# Patient Record
Sex: FEMALE | Employment: OTHER | URBAN - METROPOLITAN AREA
[De-identification: names, ages, dates, MRNs, and addresses within clinical notes are randomized per-mention and may not be internally consistent; named-entity substitution may affect disease eponyms.]

---

## 2017-10-12 ENCOUNTER — HOSPITAL ENCOUNTER (OUTPATIENT)
Dept: MAMMOGRAPHY | Age: 52
Discharge: HOME | End: 2017-10-12
Attending: OBSTETRICS & GYNECOLOGY
Payer: COMMERCIAL

## 2017-10-12 DIAGNOSIS — Z12.31 VISIT FOR SCREENING MAMMOGRAM: ICD-10-CM

## 2017-10-12 PROCEDURE — 77063 BREAST TOMOSYNTHESIS BI: CPT

## 2017-10-17 ENCOUNTER — DOCUMENTATION ONLY (OUTPATIENT)
Dept: SURGERY | Age: 52
End: 2017-10-17

## 2018-10-15 ENCOUNTER — HOSPITAL ENCOUNTER (OUTPATIENT)
Dept: MAMMOGRAPHY | Age: 53
Discharge: HOME | End: 2018-10-15
Attending: FAMILY MEDICINE
Payer: COMMERCIAL

## 2018-10-15 DIAGNOSIS — Z12.31 VISIT FOR SCREENING MAMMOGRAM: ICD-10-CM

## 2018-10-15 PROCEDURE — 77063 BREAST TOMOSYNTHESIS BI: CPT

## 2018-12-03 ENCOUNTER — OFFICE VISIT (OUTPATIENT)
Dept: ORTHOPEDIC SURGERY | Age: 53
End: 2018-12-03

## 2018-12-03 VITALS
BODY MASS INDEX: 35.79 KG/M2 | WEIGHT: 228 LBS | RESPIRATION RATE: 16 BRPM | OXYGEN SATURATION: 99 % | DIASTOLIC BLOOD PRESSURE: 76 MMHG | HEIGHT: 67 IN | TEMPERATURE: 97.4 F | HEART RATE: 62 BPM | SYSTOLIC BLOOD PRESSURE: 119 MMHG

## 2018-12-03 DIAGNOSIS — M75.01 ADHESIVE CAPSULITIS OF RIGHT SHOULDER: Primary | ICD-10-CM

## 2018-12-03 DIAGNOSIS — M25.511 RIGHT SHOULDER PAIN, UNSPECIFIED CHRONICITY: ICD-10-CM

## 2018-12-03 PROBLEM — E66.01 SEVERE OBESITY (HCC): Status: ACTIVE | Noted: 2018-12-03

## 2018-12-03 RX ORDER — LEVOTHYROXINE SODIUM 75 UG/1
TABLET ORAL
COMMUNITY

## 2018-12-03 RX ORDER — BISMUTH SUBSALICYLATE 262 MG
1 TABLET,CHEWABLE ORAL DAILY
COMMUNITY

## 2018-12-03 RX ORDER — IBUPROFEN 400 MG/1
TABLET ORAL
COMMUNITY

## 2018-12-03 RX ORDER — MELOXICAM 15 MG/1
15 TABLET ORAL
Qty: 30 TAB | Refills: 1 | Status: SHIPPED | OUTPATIENT
Start: 2018-12-03 | End: 2019-01-10 | Stop reason: SDUPTHER

## 2018-12-03 NOTE — PROGRESS NOTES
[facility] Allison 1965 Chief Complaint Patient presents with  Shoulder Pain RIGHT SHOULDER PAIN  
  
 
HISTORY OF PRESENT ILLNESS [id number] [facility] Allison is a 47 y.o. female who presents today for evaluation of right shoulder pain. She rates her pain 8/10 today. Pain has been present for 2 weeks after reaching behind her car seat. She notes he has started a new weight lifting routine recently. Patient describes the pain as aching, sharp and throbbing that is Constant in nature. Symptoms are worse with reaching behind, certain movements of the arm, Activity and is better with  Rest. Associated symptoms include stiffness. Since problem started, it: has worsened. Pain does not wake patient up at night. Has taken NSAIDS for the problem. Has tried following treatments: Injections:NO; Brace:NO; Therapy:NO; Cane/Crutch:NO Allergies Allergen Reactions  Sulfa (Sulfonamide Antibiotics) Rash and Not Reported This Time Past Medical History:  
Diagnosis Date  Amenorrhea 1/22/2010  Anemia 1/22/2010  Fibroids 1/22/2010  LBP (low back pain) 9/25/2009  Migraine headache 1/22/2010 Social History Socioeconomic History  Marital status:  Spouse name: Not on file  Number of children: Not on file  Years of education: Not on file  Highest education level: Not on file Social Needs  Financial resource strain: Not on file  Food insecurity - worry: Not on file  Food insecurity - inability: Not on file  Transportation needs - medical: Not on file  Transportation needs - non-medical: Not on file Occupational History  Occupation:  Tobacco Use  Smoking status: Never Smoker  Smokeless tobacco: Never Used Substance and Sexual Activity  Alcohol use: Yes Comment: every 1-2 months  Drug use: No  
 Sexual activity: Not on file Other Topics Concern  Not on file Social History Narrative  Not on file History reviewed. No pertinent surgical history. Family History Problem Relation Age of Onset  Cancer Mother   
     breast CA about 70yo  Hypertension Mother  Thyroid Disease Mother  Osteoporosis Mother  Breast Cancer Mother  Diabetes Father  Hypertension Father  High Cholesterol Father  Heart Disease Father 76s Current Outpatient Medications Medication Sig  levothyroxine (SYNTHROID) 75 mcg tablet Take  by mouth Daily (before breakfast).  multivitamin (ONE A DAY) tablet Take 1 Tab by mouth daily.  calcium-vits X9-D-M1-minerals 166.75 mg- 166.75 unit cap Take  by mouth.  ibuprofen (IBU) 400 mg tablet Take  by mouth every six (6) hours as needed for Pain.  multivitamin, stress formula (STRESS TAB) tablet Take 1 Tab by mouth daily. No current facility-administered medications for this visit. REVIEW OF SYSTEM Patient denies: Weight loss, Fever/Chills, HA, Visual changes, Fatigue, Chest pain, SOB, Abdominal pain, N/V/D/C, Blood in stool or urine, Edema. Pertinent positive as above in HPI. All others were negative PHYSICAL EXAM:  
Visit Vitals /76 Pulse 62 Temp 97.4 °F (36.3 °C) (Oral) Resp 16 Ht 5' 7\" (1.702 m) Wt 228 lb (103.4 kg) SpO2 99% BMI 35.71 kg/m² The patient is a well-developed, well-nourished female   in no acute distress. The patient is alert and oriented times three. The patient is alert and oriented times three. Mood and affect are normal. 
LYMPHATIC: lymph nodes are not enlarged and are within normal limits SKIN: normal in color and non tender to palpation. There are no bruises or abrasions noted. NEUROLOGICAL: Motor sensory exam is within normal limits. Reflexes are equal bilaterally. There is normal sensation to pinprick and light touch MUSCULOSKELETAL: 
Examination Right shoulder Skin Intact AC joint tenderness - Biceps tenderness - Forward flexion/Elevation  Active abduction  Glenohumeral abduction 80 External rotation ROM 45 Internal rotation ROM 30 Apprehension -  
Gregs Relocation -  
Jerk - Load and Shift -  
Jerl Clarity - Speeds - Impingement sign - Supraspinatus/Empty Can -, 5/5 External Rotation Strength -, 5/5 Lift Off/Belly Press -, 5/5 Neurovascular Intact IMAGING: XR of right shoulder dated 12/3/18 was reviewed and read: No acute abnormalities IMPRESSION:   
  ICD-10-CM ICD-9-CM 1. Adhesive capsulitis of right shoulder M75.01 726.0 REFERRAL TO PHYSICAL THERAPY 2. Right shoulder pain, unspecified chronicity M25.511 719.41 AMB POC XRAY, SHOULDER; COMPLETE, 2+ REFERRAL TO PHYSICAL THERAPY PLAN: 
1. The patient presents today with right shoulder pain due to adhesive capsulitis. I would like her to begin PT. Risk factors include: n/a 2. No ultrasound exam indicated today 3. No cortisone injection indicated today 4. Yes Physical Therapy indicated today 5. No diagnostic test indicated today: 6. No durable medical equipment indicated today 7. No referral indicated today 8. Yes medications indicated today: MOBIC 9. No Narcotic indicated today RTC 4 weeks Follow-up Disposition: Not on File Scribed by Namita Schultz ([address]) as dictated by Hira Black MD 
 
I, Dr. Hira Black, confirm that all documentation is accurate.  
 
Hira Black M.D.  
[address] [place] [address] and Spine Specialist

## 2018-12-10 ENCOUNTER — HOSPITAL ENCOUNTER (OUTPATIENT)
Dept: PHYSICAL THERAPY | Age: 53
Discharge: HOME | End: 2018-12-10
Payer: COMMERCIAL

## 2018-12-10 PROCEDURE — 97162 PT EVAL MOD COMPLEX 30 MIN: CPT

## 2018-12-10 PROCEDURE — 97140 MANUAL THERAPY 1/> REGIONS: CPT

## 2018-12-10 PROCEDURE — 97110 THERAPEUTIC EXERCISES: CPT

## 2018-12-10 NOTE — PROGRESS NOTES
PT DAILY TREATMENT NOTE 10-18 Patient Name: Shirlene Mcintyre Date:12/10/2018 : 1965 [x]  Patient  Verified Payor: Shar De Guzman / Plan: [address] HMO / Product Type: HMO /   
In time:1:57  Out time:2:45 Total Treatment Time (min): 48 Visit #: 1 of 5 Treatment Area: Pain in right shoulder [M25.511] Adhesive capsulitis of right shoulder [M75.01] SUBJECTIVE Pain Level (0-10 scale): 2 Any medication changes, allergies to medications, adverse drug reactions, diagnosis change, or new procedure performed?: [x] No    [] Yes (see summary sheet for update) Subjective functional status/changes:   [] No changes reported Patient states she has been experiencing right shoulder pain that is located in the anterior shoulder. Patient states her pain increased when reaching behind her back and wakes her up from sleep at least 2.3x/night OBJECTIVE 20 min [x]Eval                  []Re-Eval  
 
 
18 min Therapeutic Exercise:  [x] See flow sheet :  
Rationale: increase ROM and increase strength to improve the patients ability to perform ADLs. 10 min Manual Therapy:  PROM Rationale: increase ROM and increase tissue extensibility to improve patients functional mobility. With 
 [x] TE 
 [] TA 
 [] neuro 
 [] other: Patient Education: [x] Review HEP [] Progressed/Changed HEP based on:  
[] positioning   [] body mechanics   [] transfers   [] heat/ice application   
[] other:   
 
Other Objective/Functional Measures: see IE Pain Level (0-10 scale) post treatment: 4 
 
ASSESSMENT/Changes in Function: Patient presents with decreased ROM in all directions, decreased strength, decreased flexibility, and altered SH rhythm.  Patient with positive reproduction of symptoms with flores - tasha, neers test, and empty can test. 
 
Patient will continue to benefit from skilled PT services to modify and progress therapeutic interventions, address functional mobility deficits, address ROM deficits, address strength deficits, analyze and address soft tissue restrictions, analyze and cue movement patterns, analyze and modify body mechanics/ergonomics and assess and modify postural abnormalities to attain remaining goals. [x]  See Plan of Care 
[]  See progress note/recertification 
[]  See Discharge Summary Progress towards goals / Updated goals: 
Short Term Goals: To be accomplished in 1 weeks: 1. Patient will be independent and compliant with HEP 2x/day in order to improve functional mobility. Long Term Goals: To be accomplished in 3 weeks: 1. Patient will be able to improve FOTO score to 74 in order to demonstrate improvements in functional independence. 2. Patient will be able to reach a shelf that is shoulder height without difficulty in order to perform ADLs. 3. Patinet will be able to improve right shoulder IR to 50 degrees in order to improve functional mobility. PLAN 
[]  Upgrade activities as tolerated     [x]  Continue plan of care 
[]  Update interventions per flow sheet      
[]  Discharge due to:_ 
[]  Other:_ Shay Hull PT 12/10/2018  2:58 PM 
 
Future Appointments Date Time Provider Chad Emery 12/14/2018  4:30 PM [place], [address] [facility]  
12/18/2018  5:30 PM [place], [address] [facility]  
12/21/2018  3:30 PM [place], [address] [facility]  
12/26/2018  5:30 PM Ethyl Tanacross, PT [facility]  
12/28/2018  4:30 PM Ethyl Tanacross, PT [facility]

## 2018-12-10 NOTE — PROGRESS NOTES
In [address]  [address] [place], [address] 
[phone] fax Plan of Care/ Statement of Necessity for Physical Therapy Services Patient name: Bill Bolivar Start of Care: 12/10/2018 Referral source: Artis Kanner,* : 1965 Medical Diagnosis: Pain in right shoulder [M25.511] Adhesive capsulitis of right shoulder [M75.01] Payor: James Ennis / Plan: [address] HMO / Product Type: HMO /  Onset Date:2 weeks ago Treatment Diagnosis: right shoulder pain Prior Hospitalization: see medical history Provider#: [id number] Medications: Verified on Patient summary List  
 Comorbidities: none reported Prior Level of Function: Independent with ADLs, employment, and recreational activities,and able to sleep through the night. The Plan of Care and following information is based on the information from the initial evaluation. Assessment/ key information:  Patient is a 47 y.o female presenting with increased right shoulder pain that is located in the anterior shoulder. Patient states her pain increased when reaching behind her back and wakes her up from sleep at least 2.3x/night. Patient presents with decreased ROM in all directions, decreased strength, decreased flexibility, and altered SH rhythm. Patient with positive reproduction of symptoms with flores - tasha, neers test, and empty can test. Patient will benefit from skilled PT in order to address these deficits. Evaluation Complexity History MEDIUM  Complexity : 1-2 comorbidities / personal factors will impact the outcome/ POC ; Examination MEDIUM Complexity : 3 Standardized tests and measures addressing body structure, function, activity limitation and / or participation in recreation  ;Presentation MEDIUM Complexity : Evolving with changing characteristics  ; Clinical Decision Making MEDIUM Complexity : FOTO score of 26-74 Overall Complexity Rating: MEDIUM 
 Problem List: pain affecting function, decrease ROM, decrease strength, edema affecting function, decrease ADL/ functional abilitiies, decrease activity tolerance and decrease flexibility/ joint mobility Treatment Plan may include any combination of the following: Therapeutic exercise, Therapeutic activities, Neuromuscular re-education, Physical agent/modality, Manual therapy, Patient education and Functional mobility training Patient / Family readiness to learn indicated by: asking questions, trying to perform skills and interest 
Persons(s) to be included in education: patient (P) Barriers to Learning/Limitations: None Patient Goal (s): To decrease pain and improve ROM Patient Self Reported Health Status:  
Rehabilitation Potential: good Short Term Goals: To be accomplished in 1 weeks: 1. Patient will be independent and compliant with HEP 2x/day in order to improve functional mobility. Long Term Goals: To be accomplished in 3 weeks: 1. Patient will be able to improve FOTO score to 74 in order to demonstrate improvements in functional independence. 2. Patient will be able to reach a shelf that is shoulder height without difficulty in order to perform ADLs. 3. Patinet will be able to improve right shoulder IR to 50 degrees in order to improve functional mobility. Frequency / Duration: Patient to be seen 2 times per week for 3 weeks. Patient/ Caregiver education and instruction: Diagnosis, prognosis, activity modification and exercises 
 [x]  Plan of care has been reviewed with DAJUAN Ramey, PT 12/10/2018 2:54 PM 
 
________________________________________________________________________ I certify that the above Therapy Services are being furnished while the patient is under my care. I agree with the treatment plan and certify that this therapy is necessary. Physician's Signature:____________Date:_________TIME:________ ** Signature, Date and Time must be completed for valid certification ** Please sign and return to In [address] [place], [address] 
[phone] fax

## 2018-12-14 ENCOUNTER — HOSPITAL ENCOUNTER (OUTPATIENT)
Dept: PHYSICAL THERAPY | Age: 53
Discharge: HOME | End: 2018-12-14
Payer: COMMERCIAL

## 2018-12-14 PROCEDURE — 97016 VASOPNEUMATIC DEVICE THERAPY: CPT

## 2018-12-14 PROCEDURE — 97140 MANUAL THERAPY 1/> REGIONS: CPT

## 2018-12-14 PROCEDURE — 97110 THERAPEUTIC EXERCISES: CPT

## 2018-12-18 ENCOUNTER — APPOINTMENT (OUTPATIENT)
Dept: PHYSICAL THERAPY | Age: 53
End: 2018-12-18
Payer: COMMERCIAL

## 2018-12-26 ENCOUNTER — APPOINTMENT (OUTPATIENT)
Dept: PHYSICAL THERAPY | Age: 53
End: 2018-12-26
Payer: COMMERCIAL

## 2018-12-28 ENCOUNTER — APPOINTMENT (OUTPATIENT)
Dept: PHYSICAL THERAPY | Age: 53
End: 2018-12-28
Payer: COMMERCIAL

## 2019-01-03 ENCOUNTER — HOSPITAL ENCOUNTER (OUTPATIENT)
Dept: PHYSICAL THERAPY | Age: 54
Discharge: HOME | End: 2019-01-03
Payer: COMMERCIAL

## 2019-01-03 PROCEDURE — 97016 VASOPNEUMATIC DEVICE THERAPY: CPT

## 2019-01-03 PROCEDURE — 97140 MANUAL THERAPY 1/> REGIONS: CPT

## 2019-01-03 PROCEDURE — 97110 THERAPEUTIC EXERCISES: CPT

## 2019-01-03 NOTE — PROGRESS NOTES
PT DAILY TREATMENT NOTE 12 Patient Name: Jennifer Tinajero Date:1/3/2019 : 1965 [x]  Patient  Verified Payor: Shiva Kim / Plan: Jae Benson RPN / Product Type: Commerical / In time:3:32  Out time:4:36 Total Treatment Time (min): 64 Visit #: 3 of 5 Treatment Area: Pain in right shoulder [M25.511] Adhesive capsulitis of right shoulder [M75.01] SUBJECTIVE Pain Level (0-10 scale): 7 Any medication changes, allergies to medications, adverse drug reactions, diagnosis change, or new procedure performed?: [x] No    [] Yes (see summary sheet for update) Subjective functional status/changes:   [] No changes reported Pt reports that she had a lot of pain over the holidays and was also sick limiting her HEP performance. OBJECTIVE Modality rationale: decrease inflammation and decrease pain to improve the patients ability to perform ADLs with increased ease Min Type Additional Details  
 [] Estim:  []Unatt       []IFC  []Premod []Other:  []w/ice   []w/heat Position: Location:  
 [] Estim: []Att    []TENS instruct  []NMES []Other:  []w/US   []w/ice   []w/heat Position: Location:  
 []  Traction: [] Cervical       []Lumbar 
                     [] Prone          []Supine []Intermittent   []Continuous Lbs: 
[] before manual 
[] after manual  
 []  Ultrasound: []Continuous   [] Pulsed []1MHz   []3MHz W/cm2: 
Location:  
 []  Iontophoresis with dexamethasone Location: [] Take home patch  
[] In clinic  
 []  Ice     []  heat 
[]  Ice massage 
[]  Laser  
[]  Anodyne Position: Location:  
 []  Laser with stim 
[]  Other:  Position: Location:  
10 X  Vasopneumatic Device Pressure:       [x] lo [] med [] hi  
Temperature: [x] lo [] med [] hi  
[] Skin assessment post-treatment:  []intact []redness- no adverse reaction 
  []redness  adverse reaction: 46 min Therapeutic Exercise:  [] See flow sheet : including pt education and discussion Rationale: increase ROM and increase strength to improve the patients ability to perform daily tasks and self care 8 min Manual Therapy:  PROM right shoulder, right scap mobs Rationale: increase ROM and increase tissue extensibility to improve ease of ADLs With 
 [] TE 
 [] TA 
 [] neuro 
 [] other: Patient Education: [x] Review HEP [] Progressed/Changed HEP based on:  
[] positioning   [] body mechanics   [] transfers   [] heat/ice application   
[] other:   
 
Other Objective/Functional Measures: no pain reported with pectoral strength testing, some positive impingement position Pain limited flexion and ABD PROM good tolerance and overall range with IR, slight discomfort at end range ER Pain Level (0-10 scale) post treatment: 0 
 
ASSESSMENT/Changes in Function: Pt still reporting increased pain levels and limited mobility. She does report some improved mobility with therex, and decreased pain reported. Good pain control with modalities. Continue to progress pain free ROM and posterior shoulder strength for improved mechanics with ADLs. If progress limited at visit 5 return to MD. 
 
Patient will continue to benefit from skilled PT services to modify and progress therapeutic interventions, address functional mobility deficits, address ROM deficits, address strength deficits, analyze and address soft tissue restrictions, analyze and cue movement patterns and analyze and modify body mechanics/ergonomics to attain remaining goals. []  See Plan of Care 
[]  See progress note/recertification 
[]  See Discharge Summary Progress towards goals / Updated goals: 
Short Term Goals: To be accomplished in 1 weeks: 
             1. Patient will be independent and compliant with HEP 2x/day in order to improve functional mobility. pt reports compliance 1/3/19 Long Term Goals: To be accomplished in 3 weeks: 
             1. Patient will be able to improve FOTO score to 74 in order to demonstrate improvements in functional independence.  
             2. Patient will be able to reach a shelf that is shoulder height without difficulty in order to perform ADLs. Rikki Gonzalez will be able to improve right shoulder IR to 50 degrees in order to improve functional mobility. near met grossly 40 deg visual assessment 1/3/19 PLAN 
[]  Upgrade activities as tolerated     [x]  Continue plan of care 
[]  Update interventions per flow sheet      
[]  Discharge due to:_ 
[]  Other:_ Constanza Staff, DPT, CMTPT 1/3/2019  3:52 PM 
 
Future Appointments Date Time Provider Chad Emery 1/7/2019  3:00 PM [facility]

## 2019-01-07 ENCOUNTER — APPOINTMENT (OUTPATIENT)
Dept: PHYSICAL THERAPY | Age: 54
End: 2019-01-07
Payer: COMMERCIAL

## 2019-01-10 DIAGNOSIS — M75.01 ADHESIVE CAPSULITIS OF RIGHT SHOULDER: ICD-10-CM

## 2019-01-10 DIAGNOSIS — M25.511 RIGHT SHOULDER PAIN, UNSPECIFIED CHRONICITY: ICD-10-CM

## 2019-01-10 NOTE — TELEPHONE ENCOUNTER
[facility] Requests A 90 day supply on the patients behalf. Last Visit: 12/3  Next Appt: RTC-4 wks/None  Previous Refill Encounter: 12/3-30+1    Requested Prescriptions     Pending Prescriptions Disp Refills    meloxicam (MOBIC) 15 mg tablet 90 Tab 0     Sig: Take 1 Tab by mouth daily (with breakfast).

## 2019-01-14 ENCOUNTER — HOSPITAL ENCOUNTER (OUTPATIENT)
Dept: PHYSICAL THERAPY | Age: 54
Discharge: HOME | End: 2019-01-14
Payer: COMMERCIAL

## 2019-01-14 PROCEDURE — 97140 MANUAL THERAPY 1/> REGIONS: CPT

## 2019-01-14 PROCEDURE — 97016 VASOPNEUMATIC DEVICE THERAPY: CPT

## 2019-01-14 PROCEDURE — 97110 THERAPEUTIC EXERCISES: CPT

## 2019-01-14 NOTE — PROGRESS NOTES
PT DAILY TREATMENT NOTE  Patient Name: Apolonia Mcclellan Date:2019 : 1965 [x]  Patient  Verified Payor: Betsey Prado / Plan: Karla Cabello RPN / Product Type: Commerical / In time:3:30  Out time:4:12 Total Treatment Time (min): 42 Visit #: 4 of 5 Treatment Area: Pain in right shoulder [M25.511] Adhesive capsulitis of right shoulder [M75.01] SUBJECTIVE Pain Level (0-10 scale): 9 Any medication changes, allergies to medications, adverse drug reactions, diagnosis change, or new procedure performed?: [x] No    [] Yes (see summary sheet for update) Subjective functional status/changes:   [] No changes reported Pt reports that she is now having resting pain that she did not have before OBJECTIVE Modality rationale: decrease inflammation and decrease pain to improve the patients ability to perform ADLs Min Type Additional Details  
 [] Estim:  []Unatt       []IFC  []Premod []Other:  []w/ice   []w/heat Position: Location:  
 [] Estim: []Att    []TENS instruct  []NMES []Other:  []w/US   []w/ice   []w/heat Position: Location:  
 []  Traction: [] Cervical       []Lumbar 
                     [] Prone          []Supine []Intermittent   []Continuous Lbs: 
[] before manual 
[] after manual  
 []  Ultrasound: []Continuous   [] Pulsed []1MHz   []3MHz W/cm2: 
Location:  
 []  Iontophoresis with dexamethasone Location: [] Take home patch  
[] In clinic  
 []  Ice     []  heat 
[]  Ice massage 
[]  Laser  
[]  Anodyne Position: Location:  
 []  Laser with stim 
[]  Other:  Position: Location:  
10 [x]  Vasopneumatic Device Pressure:       [x] lo [] med [] hi  
Temperature: [x] lo [] med [] hi  
[] Skin assessment post-treatment:  []intact []redness- no adverse reaction 
  []redness  adverse reaction:  
 
 
 
24 min Therapeutic Exercise:  [] See flow sheet :  
 Rationale: increase ROM and increase strength to improve the patients ability to perform daily tasks 8 min Manual Therapy:  Right scap mobs, PROM right shoulder flex/abd attempted but limited by pain Rationale: increase ROM and increase tissue extensibility to improve ease of ADLs and self care With 
 [] TE 
 [] TA 
 [] neuro 
 [] other: Patient Education: [x] Review HEP [] Progressed/Changed HEP based on:  
[] positioning   [] body mechanics   [] transfers   [] heat/ice application   
[] other:   
 
Other Objective/Functional Measures:   
 
Pain Level (0-10 scale) post treatment: 7.5 ASSESSMENT/Changes in Function: Pt reporting onset of resting pain recently and still very limited by pain with motion of right shoulder. She reports performing HEP, although PT attendance has been sporadic. Advised pt to f/u with MD next week and will hold PT for foreseeable future. Patient will continue to benefit from skilled PT services to modify and progress therapeutic interventions, address functional mobility deficits, address ROM deficits, address strength deficits, analyze and address soft tissue restrictions, analyze and cue movement patterns and analyze and modify body mechanics/ergonomics to attain remaining goals. []  See Plan of Care 
[]  See progress note/recertification 
[]  See Discharge Summary Progress towards goals / Updated goals: 
Short Term Goals: To be accomplished in 1 weeks: 
             1. Patient will be independent and compliant with HEP 2x/day in order to improve functional mobility. pt reports compliance 1/3/19 Long Term Goals: To be accomplished in 3 weeks: 
             1. Patient will be able to improve FOTO score to 74 in order to demonstrate improvements in functional independence.  
             2. Patient will be able to reach a shelf that is shoulder height without difficulty in order to perform ADLs.  Not met pt reports increase in pain with reaching 1/14/19  
             3. Arnulfo Mile will be able to improve right shoulder IR to 50 degrees in order to improve functional mobility. near met grossly 40 deg visual assessment 1/3/19 PLAN 
[]  Upgrade activities as tolerated     []  Continue plan of care 
[]  Update interventions per flow sheet      
[]  Discharge due to:_ 
[x]  Other:hold PT for MD f/u Eloisa Baez, DPT, CMTPT 1/14/2019  3:39 PM 
 
Future Appointments Date Time Provider Chad Deshpandeisti 1/23/2019  2:20 PM Eduar Rios MD [facility] [address]

## 2019-01-15 RX ORDER — MELOXICAM 15 MG/1
15 TABLET ORAL
Qty: 90 TAB | Refills: 0 | Status: SHIPPED | OUTPATIENT
Start: 2019-01-15 | End: 2019-04-18 | Stop reason: SDUPTHER

## 2019-01-15 NOTE — PROGRESS NOTES
In [address]  Tamy Gil[address] [place], [address] 
[phone] fax Physical Therapy Progress Note Patient name: Giselle Yin Start of Care: 12/10/2018 Referral source: Matthias Harmon,* : 1965 Medical Diagnosis: Pain in right shoulder [M25.511] Adhesive capsulitis of right shoulder [M75.01] Payor: Jarvis Hurst / Plan: [address] HMO / Product Type: HMO /  Onset Date:2 weeks ago Treatment Diagnosis: right shoulder pain Prior Hospitalization: see medical history Provider#: [id number] Medications: Verified on Patient summary List  
 Comorbidities: none reported Prior Level of Function: Independent with ADLs, employment, and recreational activities,and able to sleep through the night. Visits from Start of Care: 4    Missed Visits: 1 Established Goals:        Excellent         Good         Limited            None 
[x] Increased ROM   []  []  [x]  [] 
[] Increased Strength  []  []  []  [] 
[x] Increased Mobility  []  []  [x]  []  
[x] Decreased Pain   []  []  []  [x] 
[] Decreased Swelling  []  []  []  [] 
 
Key Functional Changes:  
Short Term Goals: To be accomplished in 1 weeks: 
             1. Patient will be independent and compliant with HEP 2x/day in order to improve functional mobility. pt reports compliance 1/3/19 Long Term Goals: To be accomplished in 3 weeks: 
             1. Patient will be able to improve FOTO score to 74 in order to demonstrate improvements in functional independence.  
             2. Patient will be able to reach a shelf that is shoulder height without difficulty in order to perform ADLs. Not met pt reports increase in pain with reaching 19  
             3. Dianne Acosta will be able to improve right shoulder IR to 50 degrees in order to improve functional mobility. near met grossly 40 deg visual assessment 1/3/19 Updated Goals: to be achieved in  weeks: 
No goals at this time as pt on hold to f/u with MD 
 
ASSESSMENT/RECOMMENDATIONS: Pt reporting onset of resting pain recently and still very limited by pain with motion of right shoulder. She reports performing HEP, although PT attendance has been sporadic. Advised pt to f/u with MD next week and will hold PT for foreseeable future. []Continue therapy per initial plan/protocol at a frequency of   x per week for  weeks []Continue therapy with the following recommended changes:_____________________      _____________________________________________________________________ []Discontinue therapy progressing towards or have reached established goals []Discontinue therapy due to lack of appreciable progress towards goals []Discontinue therapy due to lack of attendance or compliance [x]Await Physician's recommendations/decisions regarding therapy []Other:________________________________________________________________ Thank you for this referral.   
Janis Banks DPT, CMTPT 1/15/2019 11:36 AM 
NOTE TO PHYSICIAN:  PLEASE COMPLETE THE ORDERS BELOW AND  
FAX TO [facility]: [phone] If you are unable to process this request in 24 hours please contact our office: [phone] ? I have read the above report and request that my patient continue as recommended. ? I have read the above report and request that my patient continue therapy with the following changes/special instructions:__________________________________________________________ ? I have read the above report and request that my patient be discharged from therapy. Physicians signature: ______________________________Date: ______Time:______

## 2019-01-22 ENCOUNTER — OFFICE VISIT (OUTPATIENT)
Dept: ORTHOPEDIC SURGERY | Age: 54
End: 2019-01-22

## 2019-01-22 VITALS
HEART RATE: 68 BPM | RESPIRATION RATE: 15 BRPM | TEMPERATURE: 96.4 F | HEIGHT: 67 IN | SYSTOLIC BLOOD PRESSURE: 117 MMHG | DIASTOLIC BLOOD PRESSURE: 70 MMHG | WEIGHT: 235.2 LBS | BODY MASS INDEX: 36.91 KG/M2

## 2019-01-22 DIAGNOSIS — M25.511 RIGHT SHOULDER PAIN, UNSPECIFIED CHRONICITY: ICD-10-CM

## 2019-01-22 DIAGNOSIS — M75.01 ADHESIVE CAPSULITIS OF RIGHT SHOULDER: Primary | ICD-10-CM

## 2019-01-22 NOTE — PROGRESS NOTES
1. Have you been to the ER, urgent care clinic since your last visit? Hospitalized since your last visit? No 
 
2. Have you seen or consulted any other health care providers outside of the [address] [place] [address] since your last visit? Include any pap smears or colon screening.  No

## 2019-01-22 NOTE — PROGRESS NOTES
[address] [place] [address] Allison 1965 Chief Complaint Patient presents with  Shoulder Pain RIGHT SHOULDER PAIN  
  
 
HISTORY OF PRESENT ILLNESS [address] [place] [address] Allison is a 47 y.o. female who presents today for reevaluation of right shoulder pain. Patient rates pain as 8/10 today. Pt has been attending physical therapy. Pt states her insurance would only approve 4 physical therapy sessions, which she has completed. She has not had significant improvement. She notes she is no longer able to lay on her R side. Patient denies any fever, chills, chest pain, shortness of breath or calf pain. The remainder of the review of systems is negative. There are no new illness or injuries to report since last seen in the office. There are no changes to medications, allergies, family or social history. PHYSICAL EXAM:  
Visit Vitals /70 Pulse 68 Temp 96.4 °F (35.8 °C) (Oral) Resp 15 Ht 5' 7\" (1.702 m) Wt 235 lb 3.2 oz (106.7 kg) BMI 36.84 kg/m² The patient is a well-developed, well-nourished female   in no acute distress. The patient is alert and oriented times three. The patient is alert and oriented times three. Mood and affect are normal. 
LYMPHATIC: lymph nodes are not enlarged and are within normal limits SKIN: normal in color and non tender to palpation. There are no bruises or abrasions noted. NEUROLOGICAL: Motor sensory exam is within normal limits. Reflexes are equal bilaterally. There is normal sensation to pinprick and light touch MUSCULOSKELETAL: 
Examination Right shoulder Skin Intact AC joint tenderness - Biceps tenderness - Forward flexion/Elevation  Active abduction  Glenohumeral abduction 80 External rotation ROM 45 Internal rotation ROM 30 Apprehension -  
Kodi Relocation -  
Jerk - Load and Shift -  
Sania Mu - Speeds - Impingement sign - Supraspinatus/Empty Can -, 5/5 External Rotation Strength -, 5/5 Lift Off/Belly Press -, 5/5  
 Neurovascular Intact  
  IMAGING: XR of right shoulder dated 12/3/18 was reviewed and read: No acute abnormalities IMPRESSION:   
  ICD-10-CM ICD-9-CM 1. Adhesive capsulitis of right shoulder M75.01 726.0 MRI SHOULDER RT WO CONT 2. Right shoulder pain, unspecified chronicity M25.511 719.41 MRI SHOULDER RT WO CONT PLAN:  
1. Pt presents with a frozen R shoulder. Since she is unable to continue with physical therapy, I would like for her to receive an MRI. We will see if we are able to press the issue with insurance through this. I emphasized continuing HEP. Risk factors include: n/a 2. No ultrasound exam indicated today 3. No cortisone injection indicated today 4. No Physical/Occupational Therapy indicated today 5. Yes diagnostic test indicated today: MRI R SHOULDER 
6. No durable medical equipment indicated today 7. No referral indicated today 8. No medications indicated today: 9. No Narcotic indicated today RTC after MRI Follow-up Disposition: Not on File Scribed by Raiza Green  ([address]) as dictated by Vincent Jenkins MD 
 
I, Dr. Vincent Jenkins, confirm that all documentation is accurate.  
 
Vincent Jenkins M.D.  
[facility]

## 2019-02-08 NOTE — PROGRESS NOTES
In [address] [place], [address] 
[phone] fax Physical Therapy Discharge Summary Patient name: [facility] of Care: 12/10/2018 Referral Eric Espana,* KCV: 2[birth year]               
Medical Diagnosis: Pain in right shoulder [M25.511] Adhesive capsulitis of right shoulder [M75.01] Payor: Schuyler Uriarte / Plan: [address] HMO / Product Type: HMO /  Onset Date:2 weeks ago               
Treatment Diagnosis: right shoulder pain Prior Hospitalization: see medical history Provider#: [id number] Medications: Verified on Patient summary List  
 Comorbidities: none reported 
 Prior Level of Function: Independent with ADLs, employment, and recreational activities,and able to sleep through the night.  
 
Visits from Start of Care: 4    Missed Visits: 2 Reporting Period : 12/10/18 to 1/14/19 Summary of Care: 
Short Term Goals: To be accomplished in 1 weeks: 
             1. Patient will be independent and compliant with HEP 2x/day in order to improve functional mobility. pt reports compliance 1/3/19 Long Term Goals: To be accomplished in 3 weeks: 
             1. Patient will be able to improve FOTO score to 74 in order to demonstrate improvements in functional independence.  
             2. Patient will be able to reach a shelf that is shoulder height without difficulty in order to perform ADLs. Not met pt reports increase in pain with reaching 1/14/19  
             3. Antonio Crespo will be able to improve right shoulder IR to 50 degrees in order to improve functional mobility. near met grossly 40 deg visual assessment 1/3/19 ASSESSMENT/RECOMMENDATIONS: Unable to further assess progress towards goals at this time due to non-compliance/lack of attendance. DC at this time with no further instructions to the patient.   Thank you for this referral.  
 
 [x]Discontinue therapy: []Patient has reached or is progressing toward set goals [x]Patient is non-compliant or has abdicated 
    []Due to lack of appreciable progress towards set goals Mei Dukes, PT 2/8/2019 10:57 AM

## 2019-02-13 ENCOUNTER — HOSPITAL ENCOUNTER (OUTPATIENT)
Age: 54
Discharge: HOME | End: 2019-02-13
Attending: ORTHOPAEDIC SURGERY
Payer: COMMERCIAL

## 2019-02-13 DIAGNOSIS — M25.511 RIGHT SHOULDER PAIN, UNSPECIFIED CHRONICITY: ICD-10-CM

## 2019-02-13 DIAGNOSIS — M75.01 ADHESIVE CAPSULITIS OF RIGHT SHOULDER: ICD-10-CM

## 2019-02-13 PROCEDURE — 73221 MRI JOINT UPR EXTREM W/O DYE: CPT

## 2019-02-20 ENCOUNTER — OFFICE VISIT (OUTPATIENT)
Dept: ORTHOPEDIC SURGERY | Age: 54
End: 2019-02-20

## 2019-02-20 VITALS
WEIGHT: 225 LBS | HEART RATE: 63 BPM | DIASTOLIC BLOOD PRESSURE: 83 MMHG | SYSTOLIC BLOOD PRESSURE: 119 MMHG | OXYGEN SATURATION: 96 % | HEIGHT: 67 IN | TEMPERATURE: 98 F | BODY MASS INDEX: 35.31 KG/M2

## 2019-02-20 DIAGNOSIS — M75.01 ADHESIVE CAPSULITIS OF RIGHT SHOULDER: Primary | ICD-10-CM

## 2019-02-20 RX ORDER — TRIAMCINOLONE ACETONIDE 40 MG/ML
40 INJECTION, SUSPENSION INTRA-ARTICULAR; INTRAMUSCULAR ONCE
Qty: 1 ML | Refills: 0
Start: 2019-02-20 | End: 2019-02-20

## 2019-02-20 NOTE — PATIENT INSTRUCTIONS
Rotator Cuff: Exercises      Complete at least 2 sessions of each exercise each day to get started (no days off). If beneficial and able to fit into your schedule, more sessions are recommended. Pendulum swing    1. Hold on to a table or the back of a chair with your good arm. Then bend forward a little and let your sore arm hang straight down. This exercise does not use the arm muscles. Rather, use your legs and your hips to create movement that makes your arm swing freely. 2. Use the movement from your hips and legs to guide the slightly swinging arm back and forth like a pendulum (or elephant trunk). Then guide it in circles that start small (about the size of a dinner plate). Make the circles a bit larger each day, as your pain allows. 3. Do this exercise for 5 minutes, 5 to 7 times each day. 4. As you have less pain, try bending over a little farther to do this exercise. This will increase the amount of movement at your shoulder.       Posterior stretching exercise    1. Hold the elbow of your injured arm with your other hand. 2. Use your hand to pull your injured arm gently up and across your body. You will feel a gentle stretch across the back of your injured shoulder. 3. Hold for at least 15 to 30 seconds. Then slowly lower your arm. 4. Repeat 2 to 4 times. Up-the-back stretch    1. Put your hand in your back pocket. Let it rest there to stretch your shoulder. 2. With your other hand, hold your injured arm (palm outward) behind your back by the wrist. Pull your arm up gently to stretch your shoulder. 3. Next, put a towel over your other shoulder. Put the hand of your injured arm behind your back. Now hold the back end of the towel. With the other hand, hold the front end of the towel in front of your body. Pull gently on the front end of the towel.  This will bring your hand farther up your back to stretch your shoulder.       Overhead stretch    1. Standing about an arm's length away, grasp onto a solid surface. You could use a countertop, a doorknob, or the back of a sturdy chair. 2. With your knees slightly bent, bend forward with your arms straight. Lower your upper body, and let your shoulders stretch. 3. As your shoulders are able to stretch farther, you may need to take a step or two backward. 4. Hold for at least 15 to 30 seconds. Then stand up and relax. If you had stepped back during your stretch, step forward so you can keep your hands on the solid surface. 5. Repeat 2 to 4 times.               Shoulder flexion (lying down)    1. Lie on your back, holding a wand with both hands. Your palms should face down as you hold the wand. 2. Keeping your elbows straight, slowly raise your arms over your head. Raise them until you feel a stretch in your shoulders, upper back, and chest.  3. Hold for 15 to 30 seconds. 4. Repeat 2 to 4 times.       Shoulder rotation (lying down)    1. Lie on your back. Hold a wand with both hands with your elbows bent and palms up. 2. Keep your elbows close to your body, and move the wand across your body toward the sore arm. 3. Hold for 8 to 12 seconds. 4. Repeat 2 to 4 times.                         Wall climbing (to the side)    1. Stand with your side to a wall so that your fingers can just touch it at an angle about 30 degrees toward the front of your body. 2. Walk the fingers of your injured arm up the wall as high as pain permits. Try not to shrug your shoulder up toward your ear as you move your arm up. 3. Hold that position for a count of at least 15 to 20.  4. Walk your fingers back down to the starting position. 5. Repeat at least 2 to 4 times. Try to reach higher each time.       Wall climbing (to the front)    1. Face a wall, and stand so your fingers can just touch it. 2. Keeping your shoulder down, walk the fingers of your injured arm up the wall as high as pain permits. (Don't shrug your shoulder up toward your ear.)  3.  Hold your arm in that position for at least 15 to 30 seconds. 4. Slowly walk your fingers back down to where you started. 5. Repeat at least 2 to 4 times. Try to reach higher each time.                       Shoulder blade squeeze    1. Stand with your arms at your sides, and squeeze your shoulder blades together. Do not raise your shoulders up as you squeeze. 2. Hold 6 seconds. 3. Repeat 8 to 12 times.       Scapular exercise: Arm reach    1. Lie flat on your back. This exercise is a very slight motion that starts with your arms raised (elbows straight, arms straight). 2. From this position, reach higher toward the hardik or ceiling. Keep your elbows straight. All motion should be from your shoulder blade only. 3. Relax your arms back to where you started.   4. Repeat 8 to 12 times.

## 2019-02-20 NOTE — PROGRESS NOTES
Ajit Saini [place]  1965   Chief Complaint   Patient presents with    Shoulder Pain     right        HISTORY OF PRESENT ILLNESS  Neisha Choi is a 47 y.o. female who presents today for reevaluation of right shoulder pain and to review MRI. Patient rates pain as 8/10 today. She still reports pain with movements of the arm and decreased ROM. Pt states her insurance would only approve 4 physical therapy sessions, which she has completed. She has not had significant improvement. She notes she is no longer able to lay on her R side. Patient denies any fever, chills, chest pain, shortness of breath or calf pain. The remainder of the review of systems is negative. There are no new illness or injuries to report since last seen in the office. There are no changes to medications, allergies, family or social history. PHYSICAL EXAM:   Visit Vitals  /83   Pulse 63   Temp 98 °F (36.7 °C) (Oral)   Ht 5' 7\" (1.702 m)   Wt 225 lb (102.1 kg)   SpO2 96%   BMI 35.24 kg/m²     The patient is a well-developed, well-nourished female   in no acute distress. The patient is alert and oriented times three. The patient is alert and oriented times three. Mood and affect are normal.  LYMPHATIC: lymph nodes are not enlarged and are within normal limits  SKIN: normal in color and non tender to palpation. There are no bruises or abrasions noted. NEUROLOGICAL: Motor sensory exam is within normal limits. Reflexes are equal bilaterally.  There is normal sensation to pinprick and light touch  MUSCULOSKELETAL:  Examination Right shoulder   Skin Intact   AC joint tenderness -   Biceps tenderness -   Forward flexion/Elevation    Active abduction    Glenohumeral abduction 80   External rotation ROM 45   Internal rotation ROM 30   Apprehension -   Gregs Relocation -   Jerk -   Load and Shift -   Obriens -   Speeds -   Impingement sign -   Supraspinatus/Empty Can -, 5/5   External Rotation Strength -, 5/5   Lift Off/Belly Press -, 5/5   Neurovascular Intact      PROCEDURE: Right Shoulder Injection with Ultrasound Guidance  Indication:Right Shoulder pain/swelling    After sterile prep, 6 cc of Xylocaine and 1 cc of Kenalog were injected into the right shoulder. Ultrasound images captured using [email or web address] Ultrasound machine and scanned into patient's chart. [facility]  OFFICE PROCEDURE PROGRESS NOTE        Chart reviewed for the following:  Meche Rios M.D, have reviewed the History, Physical and updated the Allergic reactions for [address] performed immediately prior to start of procedure:  Meche Rios M.D, have performed the following reviews on [address] [place] [address] Drive prior to the start of the procedure:            * Patient was identified by name and date of birth   * Agreement on procedure being performed was verified  * Risks and Benefits explained to the patient  * Procedure site verified and marked as necessary  * Patient was positioned for comfort  * Consent was signed and verified     Time: 4:23 PM     Date of procedure: 2/20/2019    Procedure performed by:  Dereck Orourke M.D    Provider assisted by: (see medication administration)    How tolerated by patient: tolerated the procedure well with no complications    Comments: none    IMAGING: MRI of right shoulder dated 2/13/19 was reviewed and read: IMPRESSION:  1. Rotator cuff tendinosis with no focal tear. Articular and bursal surface fraying in the supraspinatus tendon with subacromial bursitis. 2. Inflammation in the inferior glenohumeral ligament and rotator interval, findings can be seen in adhesive capsulitis. 3. Also suspect contusion in the inferior glenoid labrum, associated with Hill-Sachs deformity. History of previous dislocation? 4. Attenuation of the posterior labrum, labral tear or degeneration also possible.  Biceps tenosynovitis and tendinosis. 5. AC joint hypertrophy and inflammation. XR of right shoulder dated 12/3/18 was reviewed and read: No acute abnormalities     IMPRESSION:      ICD-10-CM ICD-9-CM    1. Adhesive capsulitis of right shoulder M75.01 726.0 TRIAMCINOLONE ACETONIDE INJ      triamcinolone acetonide (KENALOG) 40 mg/mL injection      US GUIDE INJ/ASP/ARTHRO LG JNT/BURSA        PLAN:   1. Pt presents with right shoulder pain due to adhesive capsulitis, the MRI shows no tear. I would like to try an injection today to relieve some of the pain. Risk factors include: n/a  2. No ultrasound exam indicated today  3. Yes cortisone injection indicated today R SHOULDER US  4. No Physical/Occupational Therapy indicated today  5. No diagnostic test indicated today:   6. No durable medical equipment indicated today  7. No referral indicated today   8. No medications indicated today:   9. No Narcotic indicated today       RTC 3 weeks if pain continues  Follow-up Disposition: Not on File    Scribed by Alex Harden [facility]) as dictated by Cindi Short MD    I, Dr. Cindi Short, confirm that all documentation is accurate.     Cindi Short M.D.   [facility]

## 2019-04-18 DIAGNOSIS — M75.01 ADHESIVE CAPSULITIS OF RIGHT SHOULDER: ICD-10-CM

## 2019-04-18 DIAGNOSIS — M25.511 RIGHT SHOULDER PAIN, UNSPECIFIED CHRONICITY: ICD-10-CM

## 2019-04-22 RX ORDER — MELOXICAM 15 MG/1
TABLET ORAL
Qty: 90 TAB | Refills: 0 | Status: SHIPPED | OUTPATIENT
Start: 2019-04-22

## 2019-10-17 ENCOUNTER — HOSPITAL ENCOUNTER (OUTPATIENT)
Dept: MAMMOGRAPHY | Age: 54
Discharge: HOME | End: 2019-10-17
Attending: FAMILY MEDICINE
Payer: COMMERCIAL

## 2019-10-17 DIAGNOSIS — Z12.31 VISIT FOR SCREENING MAMMOGRAM: ICD-10-CM

## 2019-10-17 PROCEDURE — 77063 BREAST TOMOSYNTHESIS BI: CPT

## 2020-10-06 ENCOUNTER — TRANSCRIBE ORDER (OUTPATIENT)
Dept: SCHEDULING | Age: 55
End: 2020-10-06

## 2020-10-06 DIAGNOSIS — Z12.31 VISIT FOR SCREENING MAMMOGRAM: Primary | ICD-10-CM

## 2020-12-07 ENCOUNTER — HOSPITAL ENCOUNTER (OUTPATIENT)
Dept: MAMMOGRAPHY | Age: 55
Discharge: HOME | End: 2020-12-07
Attending: FAMILY MEDICINE
Payer: COMMERCIAL

## 2020-12-07 DIAGNOSIS — Z12.31 VISIT FOR SCREENING MAMMOGRAM: ICD-10-CM

## 2020-12-07 PROCEDURE — 77063 BREAST TOMOSYNTHESIS BI: CPT

## 2021-08-06 NOTE — PROGRESS NOTES
PT DAILY TREATMENT NOTE     Patient Name: Babatunde Patrick  Date:2018  : 1965  [x]  Patient  Verified  Payor: [facility] July / Plan: [address] HMO / Product Type: HMO /    In time:4:30  Out time:5:20  Total Treatment Time (min): 50  Visit #: 2 of 5    Treatment Area: Pain in right shoulder [M25.511]  Adhesive capsulitis of right shoulder [M75.01]    SUBJECTIVE  Pain Level (0-10 scale): 9-10 (w/ movement)  Any medication changes, allergies to medications, adverse drug reactions, diagnosis change, or new procedure performed?: [x] No    [] Yes (see summary sheet for update)  Subjective functional status/changes:   [] No changes reported  Pt reports she has had increased pain today with OH or behind the back reaching, unsure of what may have increased her pain    OBJECTIVE    Modality rationale: decrease inflammation and decrease pain to improve the patients ability to perform ADLs   Min Type Additional Details    [] Estim:  []Unatt       []IFC  []Premod                        []Other:  []w/ice   []w/heat  Position:  Location:    [] Estim: []Att    []TENS instruct  []NMES                    []Other:  []w/US   []w/ice   []w/heat  Position:  Location:    []  Traction: [] Cervical       []Lumbar                       [] Prone          []Supine                       []Intermittent   []Continuous Lbs:  [] before manual  [] after manual    []  Ultrasound: []Continuous   [] Pulsed                           []1MHz   []3MHz W/cm2:  Location:    []  Iontophoresis with dexamethasone         Location: [] Take home patch   [] In clinic    []  Ice     []  heat  []  Ice massage  []  Laser   []  Anodyne Position:  Location:    []  Laser with stim  []  Other:  Position:  Location:   10 [x]  Vasopneumatic Device Pressure:       [x] lo [] med [] hi   Temperature: [x] lo [] med [] hi   [] Skin assessment post-treatment:  []intact []redness- no adverse reaction    []redness  adverse reaction:         32 min Therapeutic Exercise:  [] See flow sheet :   Rationale: increase ROM to improve the patients ability to perform daily tasks with increased ease     8 min Manual Therapy:  PROM right shoulder flex/ABD/ER&IR; assessment of symptoms/end-feel   Rationale: increase ROM and increase tissue extensibility to improve ease of self care          With   [] TE   [] TA   [] neuro   [] other: Patient Education: [x] Review HEP    [] Progressed/Changed HEP based on:   [] positioning   [] body mechanics   [] transfers   [] heat/ice application    [] other:      Other Objective/Functional Measures: ER/IR capsule mobility good at 90 deg ABD today    Unable to assess crank test due to guarding/pain with elevation past ~ deg     Pain Level (0-10 scale) post treatment: 2    ASSESSMENT/Changes in Function: Pt with increased pain today, modified some ROM interventions to gravity reduced position with good tolerance. Will progress mobility as tolerated next visit. Possibly more RTC/articular pathology based on today's presentation vs adhesive capsulitis    Patient will continue to benefit from skilled PT services to modify and progress therapeutic interventions, address functional mobility deficits, address ROM deficits, address strength deficits, analyze and address soft tissue restrictions, analyze and cue movement patterns and analyze and modify body mechanics/ergonomics to attain remaining goals. []  See Plan of Care  []  See progress note/recertification  []  See Discharge Summary         Progress towards goals / Updated goals:  Short Term Goals: To be accomplished in 1 weeks:               1. Patient will be independent and compliant with HEP 2x/day in order to improve functional mobility.   Long Term Goals: To be accomplished in 3 weeks:               1.  Patient will be able to improve FOTO score to 74 in order to demonstrate improvements in functional independence.                2. Patient will be able to reach a shelf that is shoulder height without difficulty in order to perform ADLs. Hiram Bee will be able to improve right shoulder IR to 50 degrees in order to improve functional mobility.      PLAN  []  Upgrade activities as tolerated     []  Continue plan of care  []  Update interventions per flow sheet       []  Discharge due to:_  []  Other:_      Vandana Sanz DPT, CMTPT 12/14/2018  5:10 PM    Future Appointments   Date Time Provider Chad Deshpandeisti   12/18/2018  5:30 PM [facility]   12/21/2018  3:30 PM [place] [address] [facility]   12/26/2018  5:30 PM [facility], [id number]   12/28/2018  4:30 PM [facility] Sales, PT [facility] [place]

## 2021-08-24 ENCOUNTER — TRANSCRIBE ORDER (OUTPATIENT)
Dept: SCHEDULING | Age: 56
End: 2021-08-24

## 2021-08-24 DIAGNOSIS — Z12.31 ENCOUNTER FOR SCREENING MAMMOGRAM FOR BREAST CANCER: Primary | ICD-10-CM

## 2022-01-11 ENCOUNTER — HOSPITAL ENCOUNTER (OUTPATIENT)
Dept: MAMMOGRAPHY | Age: 57
Discharge: HOME | End: 2022-01-11
Attending: OBSTETRICS & GYNECOLOGY
Payer: COMMERCIAL

## 2022-01-11 DIAGNOSIS — Z12.31 ENCOUNTER FOR SCREENING MAMMOGRAM FOR BREAST CANCER: ICD-10-CM

## 2022-01-11 PROCEDURE — 77063 BREAST TOMOSYNTHESIS BI: CPT

## 2022-03-19 PROBLEM — E66.01 SEVERE OBESITY (HCC): Status: ACTIVE | Noted: 2018-12-03

## 2022-12-21 ENCOUNTER — TRANSCRIBE ORDER (OUTPATIENT)
Dept: SCHEDULING | Age: 57
End: 2022-12-21

## 2022-12-21 DIAGNOSIS — Z12.31 ENCOUNTER FOR SCREENING MAMMOGRAM FOR BREAST CANCER: Primary | ICD-10-CM

## 2023-01-18 ENCOUNTER — HOSPITAL ENCOUNTER (OUTPATIENT)
Dept: MAMMOGRAPHY | Age: 58
Discharge: HOME | End: 2023-01-18
Attending: STUDENT IN AN ORGANIZED HEALTH CARE EDUCATION/TRAINING PROGRAM
Payer: COMMERCIAL

## 2023-01-18 DIAGNOSIS — Z12.31 ENCOUNTER FOR SCREENING MAMMOGRAM FOR BREAST CANCER: ICD-10-CM

## 2023-01-18 PROCEDURE — 77063 BREAST TOMOSYNTHESIS BI: CPT

## 2023-01-31 DIAGNOSIS — Z12.31 ENCOUNTER FOR SCREENING MAMMOGRAM FOR BREAST CANCER: Primary | ICD-10-CM

## 2023-02-04 DIAGNOSIS — Z12.31 ENCOUNTER FOR SCREENING MAMMOGRAM FOR BREAST CANCER: Primary | ICD-10-CM
